# Patient Record
Sex: MALE | NOT HISPANIC OR LATINO | Employment: FULL TIME | URBAN - METROPOLITAN AREA
[De-identification: names, ages, dates, MRNs, and addresses within clinical notes are randomized per-mention and may not be internally consistent; named-entity substitution may affect disease eponyms.]

---

## 2018-01-05 ENCOUNTER — OFFICE VISIT (OUTPATIENT)
Dept: SLEEP MEDICINE | Facility: HOSPITAL | Age: 38
End: 2018-01-05
Attending: INTERNAL MEDICINE

## 2018-01-05 VITALS
DIASTOLIC BLOOD PRESSURE: 77 MMHG | HEART RATE: 80 BPM | SYSTOLIC BLOOD PRESSURE: 140 MMHG | HEIGHT: 71 IN | WEIGHT: 196 LBS | BODY MASS INDEX: 27.44 KG/M2

## 2018-01-05 DIAGNOSIS — I20.8: ICD-10-CM

## 2018-01-05 DIAGNOSIS — G47.10 HYPERSOMNOLENCE: ICD-10-CM

## 2018-01-05 DIAGNOSIS — G47.33 OSA (OBSTRUCTIVE SLEEP APNEA): Primary | ICD-10-CM

## 2018-01-05 PROCEDURE — G0463 HOSPITAL OUTPT CLINIC VISIT: HCPCS

## 2018-01-05 RX ORDER — ZOLPIDEM TARTRATE 5 MG/1
TABLET ORAL
Qty: 2 TABLET | Refills: 0 | Status: SHIPPED | OUTPATIENT
Start: 2018-01-05

## 2018-01-05 NOTE — PROGRESS NOTES
"[facility] SLEEP MEDICINE  [address] [facility]  3rd Floor  [facility] [address]  [phone]    Referring Physician: ? Cardiologist  PCP: Yoshi Harkins MD    Reason for consult:  Sleep disorders of low HR on holter study.    Jeremy Conroy is a 37 y.o.male was seen in the [facility] today. Sleeps from 9p to 6a. He has chest pains that wake him up at night. Noted to have low HR on previous Holter but also some abnormality on current Holter. Sees Dr. Neil and he doesn't remember name of cardiologist. He wakes up somewhat refreshed. He is exhausted when he returns home. Denies EDS during work hours. Snoring and some gasping respiration. He has excessive leg jerking at night per wife though patient does not notice. He reports GERD even during night. Uses restroom several times at night.    Jeremy Conroy  has no past medical history on file. high BP, poor memory, difficulty concentrating    Current Medications:  No current outpatient prescriptions on file.  NIL also listed in Sleep Questionnaire.    I have reviewed Past Medical History, Past Surgical History, Medication List, Social History and Family History as entered in Sleep Questionnaire and EPIC. 2-3 beers per week, ex-smoker 1.5ppd age 11-18, 2-3 soda daily, used to marijuana    Pertinent Positive Review of Systems of frequent urination, nasal congestion, arthritis, irregular heartbeat, chest pain, shortness of breath, frequent heartburn, diarrhea, excessive thirst, always too warm  Rest of Review of Systems was negative as recorded in Sleep Questionnaire.        Vital Signs: /77 (BP Location: Left arm, Patient Position: Sitting)  Pulse 80  Ht 180.3 cm (71\")  Wt 88.9 kg (196 lb)  BMI 27.34 kg/m2        General: Alert. Cooperative. Well developed. No acute distress.             Head:  Normocephalic. Symmetrical. Atraumatic.              Eyes: Sclera clear. No icterus. PERRLA. Normal EOM.             Ears: No deformities. " Normal hearing.             Nose: No septal deviation. No drainage.          Throat: No oral lesions. No thrush. Moist mucous membranes.    Tongue is large and dentition is missing 5       Pharynx: Posterior pharyngeal pillars are narrow with Mallampati score of class IV     Chest Wall:  Normal shape. Symmetric expansion with respiration. No tenderness.             Neck:  Trachea midline.           Lungs:  Clear to auscultation bilaterally. No wheezes. No rhonchi. No rales. Respirations regular, even and unlabored.            Heart:  Regular rhythm and normal rate. Normal S1 and S2. No murmur.     Abdomen:  Soft, non-tender and non-distended. Normal bowel sounds. No masses.  Extremities:  Moves all extremities well. No edema.           Pulses: Pulses palpable and equal bilaterally.               Skin: Dry. Intact. No bleeding. No rash.           Neuro: Moves all 4 extremities and cranial nerves grossly intact.  Psychiatric: Normal mood and affect.    Impression:  1. KATHIA (obstructive sleep apnea)    2. Nocturnal angina    3. Hypersomnolence        Plan:  This patient was having chest pains.  Reportedly also bradycardia with sleep.  Unsure if he has an underlying cardiac etiology.  However sleep apnea could certainly be making his cardiac etiology worse.  His symptoms sound also like nocturnal angina.  He needs an evaluation with an in patient sleep study so that we can carefully monitor his electrocardiogram for abnormalities.  I discussed this with the patient and he is agreeable.  General benefits of treating sleep apnea were discussed.  Patient was asked to be cautious during activities requiring full concentration especially during the day.  His sleepiness appears to be mostly at the end of the day.  However I suspect he may be underestimating due to active work life.  Weight loss would certainly help in reducing severity of sleep apnea.  I will see him back in this office after completion of study.    This  patient has typical features of obstructive sleep apnea with hypersomnolence snoring and apneas along with morbid obesity and classic oropharyngeal structure.  Likelihood of obstructive sleep apnea is high and a polysomnogram study will therefore be requested.      Possible diagnosis and pathophysiology of obstructive sleep apnea was discussed with the patient.  Health risks of untreated obstructive sleep apnea including cardiovascular risks were discussed.  Patient was cautioned about activities requiring full concentration especially driving at night or for longer distances, and until his hypersomnolence is corrected.    Results of sleep testing will be reviewed to see if patient is a candidate for CPAP machine.  Alternatives to therapy include oral mandibular advancing device (OMAD) were discussed. However OMAD is usually only beneficial in mild obstructive sleep apnea.  The benefit of weight loss in reducing severity of obstructive sleep apnea was discussed.  Patient would benefit from adhering to a strict diet to achieve ideal BMI.    Thank you for allowing me to participate in your patient's care.    MD Miguel Ángel Champion MD  (Not on file)    Part of this note may be an electronic transcription/translation of spoken language to printed text using the Dragon Dictation System.

## 2018-02-17 ENCOUNTER — HOSPITAL ENCOUNTER (OUTPATIENT)
Dept: SLEEP MEDICINE | Facility: HOSPITAL | Age: 38
Discharge: HOME | End: 2018-02-17
Attending: INTERNAL MEDICINE | Admitting: INTERNAL MEDICINE

## 2018-02-17 DIAGNOSIS — I20.8: ICD-10-CM

## 2018-02-17 DIAGNOSIS — G47.33 OSA (OBSTRUCTIVE SLEEP APNEA): ICD-10-CM

## 2018-02-17 PROCEDURE — 95810 POLYSOM 6/> YRS 4/> PARAM: CPT

## 2018-03-05 ENCOUNTER — TELEPHONE (OUTPATIENT)
Dept: SLEEP MEDICINE | Facility: HOSPITAL | Age: 38
End: 2018-03-05

## 2018-04-12 ENCOUNTER — TELEPHONE (OUTPATIENT)
Dept: SLEEP MEDICINE | Facility: HOSPITAL | Age: 38
End: 2018-04-12

## 2018-04-12 NOTE — TELEPHONE ENCOUNTER
Spoke with pt's fiance about ss results.  No ose found, oral appliance suggested for snoring.  Pt may return call to schedule f/u if he wishes